# Patient Record
Sex: FEMALE | Race: WHITE | Employment: STUDENT | URBAN - METROPOLITAN AREA
[De-identification: names, ages, dates, MRNs, and addresses within clinical notes are randomized per-mention and may not be internally consistent; named-entity substitution may affect disease eponyms.]

---

## 2018-02-23 ENCOUNTER — RECORDS - HEALTHEAST (OUTPATIENT)
Dept: LAB | Facility: CLINIC | Age: 18
End: 2018-02-23

## 2018-02-23 LAB
ANION GAP SERPL CALCULATED.3IONS-SCNC: 8 MMOL/L (ref 5–18)
BUN SERPL-MCNC: 9 MG/DL (ref 8–22)
C REACTIVE PROTEIN LHE: <0.1 MG/DL (ref 0–0.8)
CALCIUM SERPL-MCNC: 9.9 MG/DL (ref 8.5–10.5)
CHLORIDE BLD-SCNC: 103 MMOL/L (ref 98–107)
CO2 SERPL-SCNC: 31 MMOL/L (ref 22–31)
CREAT SERPL-MCNC: 0.74 MG/DL (ref 0.6–1.1)
GFR SERPL CREATININE-BSD FRML MDRD: >60 ML/MIN/1.73M2
GLUCOSE BLD-MCNC: 84 MG/DL (ref 70–125)
MAGNESIUM SERPL-MCNC: 2.4 MG/DL (ref 1.8–2.6)
POTASSIUM BLD-SCNC: 4.1 MMOL/L (ref 3.5–5)
SODIUM SERPL-SCNC: 142 MMOL/L (ref 136–145)

## 2018-10-05 ENCOUNTER — APPOINTMENT (OUTPATIENT)
Dept: MRI IMAGING | Facility: CLINIC | Age: 18
End: 2018-10-05
Attending: EMERGENCY MEDICINE
Payer: COMMERCIAL

## 2018-10-05 ENCOUNTER — HOSPITAL ENCOUNTER (EMERGENCY)
Facility: CLINIC | Age: 18
Discharge: HOME | End: 2018-10-05
Attending: EMERGENCY MEDICINE | Admitting: EMERGENCY MEDICINE
Payer: COMMERCIAL

## 2018-10-05 VITALS
RESPIRATION RATE: 16 BRPM | WEIGHT: 120.9 LBS | HEART RATE: 82 BPM | DIASTOLIC BLOOD PRESSURE: 76 MMHG | OXYGEN SATURATION: 100 % | TEMPERATURE: 98.3 F | SYSTOLIC BLOOD PRESSURE: 138 MMHG

## 2018-10-05 DIAGNOSIS — H53.9 VISUAL DISTURBANCE: ICD-10-CM

## 2018-10-05 DIAGNOSIS — R42 DIZZINESS AND GIDDINESS: ICD-10-CM

## 2018-10-05 LAB
ALBUMIN SERPL-MCNC: 4.1 G/DL (ref 3.4–5)
ALBUMIN UR-MCNC: NEGATIVE MG/DL
ALP SERPL-CCNC: 37 U/L (ref 40–150)
ALT SERPL W P-5'-P-CCNC: 16 U/L (ref 0–50)
AMORPH CRY #/AREA URNS HPF: ABNORMAL /HPF
ANION GAP SERPL CALCULATED.3IONS-SCNC: 4 MMOL/L (ref 3–14)
APPEARANCE UR: ABNORMAL
AST SERPL W P-5'-P-CCNC: 15 U/L (ref 0–35)
BASOPHILS # BLD AUTO: 0 10E9/L (ref 0–0.2)
BASOPHILS NFR BLD AUTO: 0.2 %
BILIRUB SERPL-MCNC: 0.4 MG/DL (ref 0.2–1.3)
BILIRUB UR QL STRIP: NEGATIVE
BUN SERPL-MCNC: 12 MG/DL (ref 7–30)
CALCIUM SERPL-MCNC: 8.8 MG/DL (ref 8.5–10.1)
CHLORIDE SERPL-SCNC: 105 MMOL/L (ref 96–110)
CK SERPL-CCNC: 63 U/L (ref 30–225)
CO2 SERPL-SCNC: 28 MMOL/L (ref 20–32)
COLOR UR AUTO: YELLOW
CREAT SERPL-MCNC: 0.75 MG/DL (ref 0.5–1)
DIFFERENTIAL METHOD BLD: NORMAL
EOSINOPHIL # BLD AUTO: 0 10E9/L (ref 0–0.7)
EOSINOPHIL NFR BLD AUTO: 0 %
ERYTHROCYTE [DISTWIDTH] IN BLOOD BY AUTOMATED COUNT: 12.5 % (ref 10–15)
GFR SERPL CREATININE-BSD FRML MDRD: >90 ML/MIN/1.7M2
GLUCOSE BLDC GLUCOMTR-MCNC: 126 MG/DL (ref 70–99)
GLUCOSE SERPL-MCNC: 111 MG/DL (ref 70–99)
GLUCOSE UR STRIP-MCNC: NEGATIVE MG/DL
HCG UR QL: NEGATIVE
HCT VFR BLD AUTO: 40.6 % (ref 35–47)
HGB BLD-MCNC: 13.3 G/DL (ref 11.7–15.7)
HGB UR QL STRIP: NEGATIVE
IMM GRANULOCYTES # BLD: 0 10E9/L (ref 0–0.4)
IMM GRANULOCYTES NFR BLD: 0.2 %
INTERNAL QC OK POCT: YES
KETONES UR STRIP-MCNC: NEGATIVE MG/DL
LACTATE BLD-SCNC: 1.2 MMOL/L (ref 0.7–2)
LEUKOCYTE ESTERASE UR QL STRIP: ABNORMAL
LIPASE SERPL-CCNC: 126 U/L (ref 73–393)
LYMPHOCYTES # BLD AUTO: 2.7 10E9/L (ref 0.8–5.3)
LYMPHOCYTES NFR BLD AUTO: 44.1 %
MCH RBC QN AUTO: 30.2 PG (ref 26.5–33)
MCHC RBC AUTO-ENTMCNC: 32.8 G/DL (ref 31.5–36.5)
MCV RBC AUTO: 92 FL (ref 78–100)
MONOCYTES # BLD AUTO: 0.4 10E9/L (ref 0–1.3)
MONOCYTES NFR BLD AUTO: 7.2 %
MUCOUS THREADS #/AREA URNS LPF: PRESENT /LPF
NEUTROPHILS # BLD AUTO: 2.9 10E9/L (ref 1.6–8.3)
NEUTROPHILS NFR BLD AUTO: 48.3 %
NITRATE UR QL: NEGATIVE
NRBC # BLD AUTO: 0 10*3/UL
NRBC BLD AUTO-RTO: 0 /100
PH UR STRIP: 7.5 PH (ref 5–7)
PLATELET # BLD AUTO: 214 10E9/L (ref 150–450)
POTASSIUM SERPL-SCNC: 4 MMOL/L (ref 3.4–5.3)
PROT SERPL-MCNC: 7.6 G/DL (ref 6.8–8.8)
RBC # BLD AUTO: 4.4 10E12/L (ref 3.8–5.2)
RBC #/AREA URNS AUTO: 0 /HPF (ref 0–2)
SODIUM SERPL-SCNC: 137 MMOL/L (ref 133–144)
SOURCE: ABNORMAL
SP GR UR STRIP: 1.02 (ref 1–1.03)
SQUAMOUS #/AREA URNS AUTO: 5 /HPF (ref 0–1)
TRANS CELLS #/AREA URNS HPF: <1 /HPF (ref 0–1)
UROBILINOGEN UR STRIP-MCNC: NORMAL MG/DL (ref 0–2)
WBC # BLD AUTO: 6 10E9/L (ref 4–11)
WBC #/AREA URNS AUTO: ABNORMAL /HPF (ref 0–5)

## 2018-10-05 PROCEDURE — 81003 URINALYSIS AUTO W/O SCOPE: CPT | Performed by: EMERGENCY MEDICINE

## 2018-10-05 PROCEDURE — 82550 ASSAY OF CK (CPK): CPT | Performed by: EMERGENCY MEDICINE

## 2018-10-05 PROCEDURE — 83605 ASSAY OF LACTIC ACID: CPT | Performed by: EMERGENCY MEDICINE

## 2018-10-05 PROCEDURE — 81025 URINE PREGNANCY TEST: CPT | Performed by: EMERGENCY MEDICINE

## 2018-10-05 PROCEDURE — 87086 URINE CULTURE/COLONY COUNT: CPT | Performed by: EMERGENCY MEDICINE

## 2018-10-05 PROCEDURE — 00000146 ZZHCL STATISTIC GLUCOSE BY METER IP

## 2018-10-05 PROCEDURE — 80053 COMPREHEN METABOLIC PANEL: CPT | Performed by: EMERGENCY MEDICINE

## 2018-10-05 PROCEDURE — 99285 EMERGENCY DEPT VISIT HI MDM: CPT | Mod: 25

## 2018-10-05 PROCEDURE — 85025 COMPLETE CBC W/AUTO DIFF WBC: CPT | Performed by: EMERGENCY MEDICINE

## 2018-10-05 PROCEDURE — A9585 GADOBUTROL INJECTION: HCPCS | Performed by: EMERGENCY MEDICINE

## 2018-10-05 PROCEDURE — 25500064 ZZH RX 255 OP 636: Performed by: EMERGENCY MEDICINE

## 2018-10-05 PROCEDURE — 70553 MRI BRAIN STEM W/O & W/DYE: CPT

## 2018-10-05 PROCEDURE — 99285 EMERGENCY DEPT VISIT HI MDM: CPT | Mod: Z6 | Performed by: EMERGENCY MEDICINE

## 2018-10-05 PROCEDURE — 83690 ASSAY OF LIPASE: CPT | Performed by: EMERGENCY MEDICINE

## 2018-10-05 RX ORDER — NORETHINDRONE ACETATE AND ETHINYL ESTRADIOL 1MG-20(21)
1 KIT ORAL DAILY
COMMUNITY

## 2018-10-05 RX ORDER — GADOBUTROL 604.72 MG/ML
7.5 INJECTION INTRAVENOUS ONCE
Status: COMPLETED | OUTPATIENT
Start: 2018-10-05 | End: 2018-10-05

## 2018-10-05 RX ADMIN — GADOBUTROL 5.5 ML: 604.72 INJECTION INTRAVENOUS at 14:43

## 2018-10-05 ASSESSMENT — ENCOUNTER SYMPTOMS
DYSURIA: 0
FEVER: 0
UNEXPECTED WEIGHT CHANGE: 0
FATIGUE: 0
DIZZINESS: 0
PALPITATIONS: 0
BACK PAIN: 0
COUGH: 0
DIARRHEA: 0
EYE REDNESS: 0
CHILLS: 1
LIGHT-HEADEDNESS: 1
APPETITE CHANGE: 0
NAUSEA: 0
EYE PAIN: 0
DECREASED CONCENTRATION: 1
VOMITING: 0
PHOTOPHOBIA: 1
CONSTIPATION: 0
ABDOMINAL PAIN: 0
SHORTNESS OF BREATH: 0
FREQUENCY: 0
SORE THROAT: 0
HEADACHES: 1

## 2018-10-05 ASSESSMENT — VISUAL ACUITY
OD: 20/20
OS: 20/20

## 2018-10-05 NOTE — ED AVS SNAPSHOT
[facility], [place], Emergency Department    [address] [place] [address]    Phone:  [phone]                                       Natalie Kelly   MRN: [id number]    Department:  [facility], Emergency Department   Date of Visit:  10/5/2018           After Visit Summary Signature Page     I have received my discharge instructions, and my questions have been answered. I have discussed any challenges I see with this plan with the nurse or doctor.    ..........................................................................................................................................  Patient/Patient Representative Signature      ..........................................................................................................................................  Patient Representative Print Name and Relationship to Patient    ..................................................               ................................................  Date                                   Time    ..........................................................................................................................................  Reviewed by Signature/Title    ...................................................              ..............................................  Date                                               Time          22EPIC Rev 08/18

## 2018-10-05 NOTE — ED AVS SNAPSHOT
[facility], Emergency Department    [address] [facility] [address]    Phone:  [phone]                                       Natalie Kelly   MRN: [id number]    Department:  [facility], Emergency Department   Date of Visit:  10/5/2018           Patient Information     Date Of Birth          2000        Your diagnoses for this visit were:     Visual disturbance        You were seen by Vera Miller MD.      Follow-up Information     Follow up with Tameka Narayan MD.    Contact information:    [facility] [address] [place] [address] [phone]          Discharge Instructions       We saw you today for evaluation of visual disturbances. Labs and MRI were normal today.    Please make an appointment to follow up with Your Primary Care Provider in 7 days.     Please make an appointment to follow up with Eye Clinic (phone: [phone]).    Please make an appointment to follow up with Neurology Clinic (phone: [phone]).            24 Hour Appointment Hotline       To make an appointment at any [facility], call [phone] ([phone]). If you don't have a family doctor or clinic, we will help you find one. [place] clinics are conveniently located to serve the needs of you and your family.          ED Discharge Orders     NEUROLOGY ADULT REFERRAL       Your provider has referred you for the following:   Consult at [facility]: Neurology Clinic - [place] [phone]   [email or web address]  General Neurology    Please be aware that coverage of these services is subject to the terms and limitations of your health insurance plan.  Call member services at your health plan with any benefit or coverage questions.      Please bring the following with you to your appointment:    (1) Any X-Rays, CTs or MRIs which have been performed.  Contact the facility where they were done to arrange for  prior to your scheduled  appointment.    (2) List of current medications  (3) This referral request   (4) Any documents/labs given to you for this referral                     Review of your medicines      Our records show that you are taking the medicines listed below. If these are incorrect, please call your family doctor or clinic.        Dose / Directions Last dose taken    ADDERALL XR PO   Dose:  20 mg        Take 20 mg by mouth daily   Refills:  0        EFFEXOR PO   Dose:  150 mg        Take 150 mg by mouth daily   Refills:  0        IMITREX PO   Dose:  25 mg        Take 25 mg by mouth as needed for migraine   Refills:  0        norethindrone-ethinyl estradiol 1-20 MG-MCG per tablet   Commonly known as:  JUNEL FE 1/20   Dose:  1 tablet        Take 1 tablet by mouth daily   Refills:  0                Procedures and tests performed during your visit     CBC with platelets differential    CK total    Comprehensive metabolic panel    Glucose by meter    Glucose monitor nursing POCT    Lactic acid whole blood    Lipase    MR Brain w/o & w Contrast    UA reflex to Microscopic and Culture    Vision checks    hCG qual urine POCT      Orders Needing Specimen Collection     None      Pending Results     Date and Time Order Name Status Description    10/5/2018 1342 CK total In process             Pending Culture Results     No orders found from 10/3/2018 to 10/6/2018.            Pending Results Instructions     If you had any lab results that were not finalized at the time of your Discharge, you can call the ED Lab Result RN at [phone]. You will be contacted by this team for any positive Lab results or changes in treatment. The nurses are available 7 days a week from 10A to 6:30P.  You can leave a message 24 hours per day and they will return your call.        Thank you for choosing [facility]       Thank you for choosing [facility] for your care. Our goal is always to provide you with excellent care. Hearing back from our patients is one way  "we can continue to improve our services. Please take a few minutes to complete the written survey that you may receive in the mail after you visit with us. Thank you!        [employer] Information     [employer] lets you send messages to your doctor, view your test results, renew your prescriptions, schedule appointments and more. To sign up, go to [email or web address] . Click on \"Log in\" on the left side of the screen, which will take you to the Welcome page. Then click on \"Sign up Now\" on the right side of the page.     You will be asked to enter the access code listed below, as well as some personal information. Please follow the directions to create your username and password.     Your access code is: [email or web address]  Expires: 1/3/2019  4:50 PM     Your access code will  in 90 days. If you need help or a new code, please call your [place] clinic or [phone].        Care EveryWhere ID     This is your Care EveryWhere ID. This could be used by other organizations to access your [place] medical records  [id number]        Equal Access to Services     MERISSA HENAO : Haddelia Mckinney, du zhang, apolinar cobos, nora gregg. So [facility] [phone].    ATENCIÓN: Si habla español, tiene a dockery disposición servicios gratuitos de asistencia lingüística. Lawrence al [phone].    We comply with applicable federal civil rights laws and [place] laws. We do not discriminate on the basis of race, color, national origin, age, disability, sex, sexual orientation, or gender identity.            After Visit Summary       This is your record. Keep this with you and show to your community pharmacist(s) and doctor(s) at your next visit.                  "

## 2018-10-05 NOTE — DISCHARGE INSTRUCTIONS
We saw you today for evaluation of visual disturbances. Labs and MRI were normal today.    Please make an appointment to follow up with Your Primary Care Provider in 7 days.     Please make an appointment to follow up with Eye Clinic (phone: [phone]).    Please make an appointment to follow up with Neurology Clinic (phone: [phone]).

## 2018-10-05 NOTE — ED PROVIDER NOTES
"  History     Chief Complaint   Patient presents with     Vision Changes Od     HPI  Natalie Kelly is a 17 year old female who presents with second recurrence of vision disturbance, confusion, grogginess, feeling \"off\" and expressive difficulty, headaches. Onset Wednesday, described as hand-eye discoordination, reaching for objects in the wrong locations, freezing of hands when writing and being unable to move. Also notes fatigue and increased sleep. Reports night sweats x last 3 days, soaking through her sheets. On Thursday headache worsened, and vision disturbances described as colour disturbance, depth and focus instability, changes in brightness perception.    Prior episode of prior symptoms occurred this summer when she was in [place] which resolved after resting in an air conditioned room.    PMH: MDD, migraine, ADHD    Has a very extensive history of strange neurological manifestations including twitching; cyclic migraines starting at age 5; extremity tingling, paresthesias; and starting at age 8 episodes of hands freezing, staring - never diagnosed with absence seizures. Unsure if she loses consciousness/memory during these episodes. Has completed extensive journalling of her symptoms in the past but has not found any correlations.    FH: great grandfather (her mother's maternal grandfather) had unusual neurologic disease and  suddenly in his 50s. Extensive family history of autoimmune diseases.  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    I have reviewed the Medications, Allergies, Past Medical and Surgical History, and Social History in the Epic system.    Review of Systems   Constitutional: Positive for chills. Negative for appetite change, fatigue, fever and unexpected weight change.   HENT: Negative for sore throat.    Eyes: Positive for photophobia and visual disturbance. Negative for pain and redness. "   Respiratory: Negative for cough and shortness of breath.    Cardiovascular: Negative for chest pain, palpitations and leg swelling.   Gastrointestinal: Negative for abdominal pain, constipation, diarrhea, nausea and vomiting.   Genitourinary: Negative for dysuria and frequency.   Musculoskeletal: Negative for back pain.   Neurological: Positive for light-headedness and headaches. Negative for dizziness.   Psychiatric/Behavioral: Positive for decreased concentration.   All other systems reviewed and are negative.      Physical Exam   BP: 130/80  Heart Rate: 103  Temp: 98.3  F (36.8  C)  Resp: 16  Weight: 54.8 kg (120 lb 14.4 oz)  SpO2: 98 %  Visual Acuity-Left: 20/20  Visual Acuity-Right: 20/20      Physical Exam   Constitutional: She is oriented to person, place, and time. She appears well-developed and well-nourished. No distress.   HENT:   Head: Normocephalic and atraumatic.   Eyes: Conjunctivae and EOM are normal. Pupils are equal, round, and reactive to light.   Visual acuity 20/20   Neck: Normal range of motion.   Cardiovascular: Normal rate, regular rhythm and normal heart sounds.    Pulmonary/Chest: Effort normal and breath sounds normal. No respiratory distress. She has no wheezes. She has no rales. She exhibits no tenderness.   Abdominal: Soft. There is no tenderness.   Musculoskeletal: Normal range of motion.   Neurological: She is alert and oriented to person, place, and time. She has normal strength. No cranial nerve deficit or sensory deficit. She displays a negative Romberg sign. GCS eye subscore is 4. GCS verbal subscore is 5. GCS motor subscore is 6.   Reflex Scores:       Patellar reflexes are 3+ on the right side and 3+ on the left side.  Skin: Skin is warm. She is not diaphoretic.   Psychiatric: She has a normal mood and affect.       ED Course     ED Course     Procedures           Results for orders placed or performed during the hospital encounter of 10/05/18 (from the past 24 hour(s))    Glucose by meter   Result Value Ref Range    Glucose 126 (H) 70 - 99 mg/dL   CBC with platelets differential   Result Value Ref Range    WBC 6.0 4.0 - 11.0 10e9/L    RBC Count 4.40 3.8 - 5.2 10e12/L    Hemoglobin 13.3 11.7 - 15.7 g/dL    Hematocrit 40.6 35.0 - 47.0 %    MCV 92 78 - 100 fl    MCH 30.2 26.5 - 33.0 pg    MCHC 32.8 31.5 - 36.5 g/dL    RDW 12.5 10.0 - 15.0 %    Platelet Count 214 150 - 450 10e9/L    Diff Method Automated Method     % Neutrophils 48.3 %    % Lymphocytes 44.1 %    % Monocytes 7.2 %    % Eosinophils 0.0 %    % Basophils 0.2 %    % Immature Granulocytes 0.2 %    Nucleated RBCs 0 0 /100    Absolute Neutrophil 2.9 1.6 - 8.3 10e9/L    Absolute Lymphocytes 2.7 0.8 - 5.3 10e9/L    Absolute Monocytes 0.4 0.0 - 1.3 10e9/L    Absolute Eosinophils 0.0 0.0 - 0.7 10e9/L    Absolute Basophils 0.0 0.0 - 0.2 10e9/L    Abs Immature Granulocytes 0.0 0 - 0.4 10e9/L    Absolute Nucleated RBC 0.0    Comprehensive metabolic panel   Result Value Ref Range    Sodium 137 133 - 144 mmol/L    Potassium 4.0 3.4 - 5.3 mmol/L    Chloride 105 96 - 110 mmol/L    Carbon Dioxide 28 20 - 32 mmol/L    Anion Gap 4 3 - 14 mmol/L    Glucose 111 (H) 70 - 99 mg/dL    Urea Nitrogen 12 7 - 30 mg/dL    Creatinine 0.75 0.50 - 1.00 mg/dL    GFR Estimate >90 >60 mL/min/1.7m2    GFR Estimate If Black >90 >60 mL/min/1.7m2    Calcium 8.8 8.5 - 10.1 mg/dL    Bilirubin Total 0.4 0.2 - 1.3 mg/dL    Albumin 4.1 3.4 - 5.0 g/dL    Protein Total 7.6 6.8 - 8.8 g/dL    Alkaline Phosphatase 37 (L) 40 - 150 U/L    ALT 16 0 - 50 U/L    AST 15 0 - 35 U/L   Lipase   Result Value Ref Range    Lipase 126 73 - 393 U/L   Lactic acid whole blood   Result Value Ref Range    Lactic Acid 1.2 0.7 - 2.0 mmol/L   UA reflex to Microscopic and Culture   Result Value Ref Range    Color Urine Yellow     Appearance Urine Cloudy     Glucose Urine Negative NEG^Negative mg/dL    Bilirubin Urine Negative NEG^Negative    Ketones Urine Negative NEG^Negative mg/dL     Specific Gravity Urine 1.018 1.003 - 1.035    Blood Urine Negative NEG^Negative    pH Urine 7.5 (H) 5.0 - 7.0 pH    Protein Albumin Urine Negative NEG^Negative mg/dL    Urobilinogen mg/dL Normal 0.0 - 2.0 mg/dL    Nitrite Urine Negative NEG^Negative    Leukocyte Esterase Urine Large (A) NEG^Negative    Source Clean catch urine     RBC Urine 0 0 - 2 /HPF    WBC Urine None 0 - 5 /HPF    Squamous Epithelial /HPF Urine 5 (H) 0 - 1 /HPF    Transitional Epi <1 0 - 1 /HPF    Mucous Urine Present (A) NEG^Negative /LPF    Amorphous Crystals Few (A) NEG^Negative /HPF   hCG qual urine POCT   Result Value Ref Range    HCG Qual Urine Negative neg    Internal QC OK Yes    MR Brain w/o & w Contrast    Narrative    MR BRAIN W/O & W CONTRAST 10/5/2018 2:42 PM    Provided History: visual changes, confusion, h/o migraine headaches;     Comparison:  None     Technique: Sagittal T1-weighted and axial T2-weighted, turboFLAIR and  diffusion-weighted with ADC map images of the brain were obtained  without intravenous contrast.    Findings: These images reveal no intracranial mass lesion, mass  effect, midline shift or abnormal extraaxial fluid collection. The  ventricles and sulci are normal for age. Diffusion-weighted images  demonstrate no abnormality of reduced diffusion.  Normal intravascular  flow voids are identified.      Impression    Impression: No findings on this study to explain the patient's  clinical presentation..    I have personally reviewed the examination and initial interpretation  and I agree with the findings.    JACY SEGUNDO MD      Labs Ordered and Resulted from Time of ED Arrival Up to the Time of Departure from the ED   GLUCOSE BY METER - Abnormal; Notable for the following:        Result Value    Glucose 126 (*)     All other components within normal limits   COMPREHENSIVE METABOLIC PANEL - Abnormal; Notable for the following:     Glucose 111 (*)     Alkaline Phosphatase 37 (*)     All other components within  normal limits   UA MACROSCOPIC WITH REFLEX TO MICRO AND CULTURE - Abnormal; Notable for the following:     pH Urine 7.5 (*)     Leukocyte Esterase Urine Large (*)     Squamous Epithelial /HPF Urine 5 (*)     Mucous Urine Present (*)     Amorphous Crystals Few (*)     All other components within normal limits   HCG QUAL URINE POCT - Normal   GLUCOSE MONITOR NURSING POCT   CBC WITH PLATELETS DIFFERENTIAL   LIPASE   LACTIC ACID WHOLE BLOOD   VISION CHECKS            Assessments & Plan (with Medical Decision Making)   18 yo F with PMH migraines, MDD, ADHD, and extensive history of undiagnosed neurologic disturbances presenting for second episode of acute vision changes, confusion, dizziness. Differential include absence seizure, intracranial neoplasm, atypical migraine, vertigo. Neurological exam grossly normal except for increased patellar reflexes. Visual acuity 20/20.    CBC, CMP, UA, LA, lipase wnl. MRI unremarkable. Her neurological issues are ongoing and patient and mother understands that we are unlikely to arrive at a diagnosis today, and accepts reassurance for current symptoms based on normal labs and MRI.      Neurology consulted, recommended neurology outpatient follow up as well as opthalmology follow up for dilated exam. Patient stable for discharge to home.    I have reviewed the nursing notes.    I have reviewed the findings, diagnosis, plan and need for follow up with the patient.    New Prescriptions    No medications on file       Final diagnoses:   Visual disturbance       10/5/2018   [facility], [place], EMERGENCY DEPARTMENT  ATTENDING NOTE: The patient was seen with Dr. Shoemaker, resident. I was personally and directly involved in patient care including obtaining the history, performing the physical exam, ordering and reviewing of laboratory tests, and decision-making process. Plan of care was discussed with the patient. Natalie Kelly is a 17 year old female who presents with visual disturbances and  confusion.   Physical Exam   /80  Temp 98.3  F (36.8  C) (Oral)  Resp 16  Wt 54.8 kg (120 lb 14.4 oz)  SpO2 98%   Constitutional:   well nourished, well developed, resting comfortably   HENT:   Head: Normocephalic and atraumatic.   Eyes: Conjunctivae are normal. Pupils are equal, round, and reactive to light. EOMI visual acuity is 20/20 on the left and 20/20 on the right  pharynx has no erythema or exudate, mucous membranes are moist  Neck:   no adenopathy, no bony tenderness  Cardiovascular: regular rate and rhythm without murmurs or gallops  Pulmonary/Chest: Clear to auscultation bilaterally, with no wheezes or retractions. No respiratory distress.  GI: Soft with good bowel sounds.  Non-tender, non-distended, with no guarding, no rebound, no peritoneal signs.   Back:  No bony or CVA tenderness   Musculoskeletal:  no edema or clubbing   Skin: Skin is warm and dry. No rash noted.   Neurological: alert and oriented to person, place, and time. Nonfocal exam  NEUROLOGIC: speech normal, mental status intact, muscle tone normal, muscle strength normal, 2+ patellar reflexes 2/4 and symmetric, CN II-XII intact: face symmetric, facial sensation to light touch equal, symmetric cheek puff, eyebrow raise, palate elevation. PERRL, no nystagmus. EOMI. Tongue midline. Shoulder shrug strong and symmetric.  Motor: normal bulk/tone, no muscle wasting or fasciculations  No pronator drift, Romberg negative, able to do finger to nose accurately,   Gait: normal, able to tiptoe and heel walk, able to walk heel to toe     Psychiatric:  normal mood and affect.   Emergency Department course:  The patient was seen and examined at 1255 pm.   Laboratory studies show an unremarkable CBC, CMP and lipase apart from an elevated glucose of 111. Lactate is normal as 1.2.  UA is contaminated with squamous epithelial cells .  HCG is negative.   Given the patient's symptoms, I elected to obtain an MRI of the brain with and without contrast.   MRI is unremarkable and do not explain this patient's clinical presentation.  Visual acuity is 20/20 in both eyes.   The patient was also seen by neurology in consultation.  Please see their note for details.  This is a 17-year-old female with visual complaints and confusion as noted in HPI above.  Laboratory and radiographic studies are essentially unremarkable.  I believe the patient is safe to be discharged home.  She was given the number to the eye clinic for follow up.  In addition she should follow-up with her regular physician as well as with neurology as an outpatient.   Vera Miller MD  This note was created in part by the use of Dragon voice recognition dictation system. Inadvertent grammatical errors and typographical errors may still exist.  MD Paul Garsia Alda L, MD  10/05/18 0332

## 2018-10-05 NOTE — ED TRIAGE NOTES
Pt comes in with c/o vision changes, confusion, mild headache, and fatigue since Wednesday. Pt says this happened once before while she was travelling in [place] over the summer. At that time her PCP thought her symptoms were related to heat exhaustion. Alert and oriented. VSS.

## 2018-10-05 NOTE — ED AVS SNAPSHOT
[facility], [place], EMERGENCY DEPARTMENT: [phone]                                              INTERAGENCY TRANSFER FORM - LAB / IMAGING / EKG / EMG RESULTS   10/5/2018                    Hospital Admission Date: 10/5/2018  SALONI HERMOSILLO   : 2000  Sex: Female        Attending Provider: Vera Miller MD     Allergies:  No Known Allergies    Infection:  None   Service:  None    Ht:  --   Wt:  54.8 kg (120 lb 14.4 oz)   Admission Wt:  54.8 kg (120 lb 14.4 oz)    BMI:  --   BSA:  --            Patient PCP Information     Provider PCP Type    Tameka Narayan MD General         Lab Results - 3 Days      CK total [[id number]]  Resulted: 10/05/18 1626, Result status: In process    Ordering provider: Vera Miller MD  10/05/18 1342 Resulting lab: [facility]    Specimen Information    Type Source Collected On     10/05/18 1342            UA reflex to Microscopic and Culture [[id number]] (Abnormal)  Resulted: 10/05/18 1441, Result status: Edited Result - FINAL    Ordering provider: Vera Miller MD  10/05/18 1318 Resulting lab: [facility]    Specimen Information    Type Source Collected On   Urine Urine clean catch 10/05/18 1402          Components       Value Reference Range Flag Lab   Color Urine Yellow   51   Appearance Urine Cloudy   51   Glucose Urine Negative NEG^Negative mg/dL  51   Bilirubin Urine Negative NEG^Negative  51   Ketones Urine Negative NEG^Negative mg/dL  51   Specific Gravity Urine 1.018 1.003 - 1.035  51   Blood Urine Negative NEG^Negative  51   pH Urine 7.5 5.0 - 7.0 pH H 51   Protein Albumin Urine Negative NEG^Negative mg/dL  51   Urobilinogen mg/dL Normal 0.0 - 2.0 mg/dL  51   Nitrite Urine Negative NEG^Negative  51   Leukocyte Esterase Urine Large NEG^Negative A 51   Source Clean catch urine   51   RBC Urine 0 0 - 2 /HPF  51   WBC Urine None 0 - 5 /HPF  51   Squamous Epithelial /HPF Urine 5 0 - 1 /HPF H 51   Transitional Epi <1 0 - 1 /HPF  51   Mucous  Urine Present NEG^Negative /LPF A 51   Amorphous Crystals Few NEG^Negative /HPF A 51            Lipase [[id number]]  Resulted: 10/05/18 1415, Result status: Final result    Ordering provider: Vera Miller MD  10/05/18 1318 Resulting lab: [facility]    Specimen Information    Type Source Collected On   Blood  10/05/18 1342          Components       Value Reference Range Flag Lab   Lipase 126 73 - 393 U/L  51            Comprehensive metabolic panel [[id number]] (Abnormal)  Resulted: 10/05/18 1415, Result status: Final result    Ordering provider: Vera Miller MD  10/05/18 1318 Resulting lab: [facility]    Specimen Information    Type Source Collected On   Blood  10/05/18 1342          Components       Value Reference Range Flag Lab   Sodium 137 133 - 144 mmol/L  51   Potassium 4.0 3.4 - 5.3 mmol/L  51   Chloride 105 96 - 110 mmol/L  51   Carbon Dioxide 28 20 - 32 mmol/L  51   Anion Gap 4 3 - 14 mmol/L  51   Glucose 111 70 - 99 mg/dL H 51   Urea Nitrogen 12 7 - 30 mg/dL  51   Creatinine 0.75 0.50 - 1.00 mg/dL  51   GFR Estimate >90 >60 mL/min/1.7m2  51   Comment:  Non  GFR Calc   GFR Estimate If Black >90 >60 mL/min/1.7m2  51   Comment:  African American GFR Calc   Calcium 8.8 8.5 - 10.1 mg/dL  51   Bilirubin Total 0.4 0.2 - 1.3 mg/dL  51   Albumin 4.1 3.4 - 5.0 g/dL  51   Protein Total 7.6 6.8 - 8.8 g/dL  51   Alkaline Phosphatase 37 40 - 150 U/L L 51   ALT 16 0 - 50 U/L  51   AST 15 0 - 35 U/L  51            Lactic acid whole blood [[id number]]  Resulted: 10/05/18 1356, Result status: Final result    Ordering provider: Vera Miller MD  10/05/18 1312 Resulting lab: [facility]    Specimen Information    Type Source Collected On   Blood  10/05/18 1342          Components       Value Reference Range Flag Lab   Lactic Acid 1.2 0.7 - 2.0 mmol/L  51            CBC with platelets differential  [[id number]]  Resulted: 10/05/18 1354, Result status: Final result    Ordering provider: Vera Miller MD  10/05/18 1318 Resulting lab: [facility]    Specimen Information    Type Source Collected On   Blood  10/05/18 1342          Components       Value Reference Range Flag Lab   WBC 6.0 4.0 - 11.0 10e9/L  51   RBC Count 4.40 3.8 - 5.2 10e12/L  51   Hemoglobin 13.3 11.7 - 15.7 g/dL  51   Hematocrit 40.6 35.0 - 47.0 %  51   MCV 92 78 - 100 fl  51   MCH 30.2 26.5 - 33.0 pg  51   MCHC 32.8 31.5 - 36.5 g/dL  51   RDW 12.5 10.0 - 15.0 %  51   Platelet Count 214 150 - 450 10e9/L  51   Diff Method Automated Method   51   % Neutrophils 48.3 %  51   % Lymphocytes 44.1 %  51   % Monocytes 7.2 %  51   % Eosinophils 0.0 %  51   % Basophils 0.2 %  51   % Immature Granulocytes 0.2 %  51   Nucleated RBCs 0 0 /100  51   Absolute Neutrophil 2.9 1.6 - 8.3 10e9/L  51   Absolute Lymphocytes 2.7 0.8 - 5.3 10e9/L  51   Absolute Monocytes 0.4 0.0 - 1.3 10e9/L  51   Absolute Eosinophils 0.0 0.0 - 0.7 10e9/L  51   Absolute Basophils 0.0 0.0 - 0.2 10e9/L  51   Abs Immature Granulocytes 0.0 0 - 0.4 10e9/L  51   Absolute Nucleated RBC 0.0   51            Glucose by meter [[id number]] (Abnormal)  Resulted: 10/05/18 1145, Result status: Final result    Ordering provider: Unknown, Provider  10/05/18 1133 Resulting lab: POINT OF CARE TEST, GLUCOSE    Specimen Information    Type Source Collected On     10/05/18 1133          Components       Value Reference Range Flag Lab   Glucose 126 70 - 99 mg/dL H 170            Testing Performed By     Lab - Abbreviation Name Director Address Valid Date Range    45 - [facility] Unknown Unknown 01/28/02 0000 - Present    51 - Unknown [facility] Unknown [address] [facility] [address] 12/31/14 1010 - Present    170 - Unknown POINT OF CARE TEST, GLUCOSE Unknown Unknown 10/31/11 1114 - Present            Unresulted Labs     None          Imaging Results - 3 Days      MR Brain w/o & w Contrast [[id number]]  Resulted: 10/05/18 1521, Result status: Final result    Ordering provider: Vera Miller MD  10/05/18 1319 Resulted by: Eric Segundo MD Levy, Gilbert Callaway MD    Performed: 10/05/18 1415 - 10/05/18 1442 Resulting lab: RADIOLOGY RESULTS    Narrative:       MR BRAIN W/O & W CONTRAST 10/5/2018 2:42 PM    Provided History: visual changes, confusion, h/o migraine headaches;     Comparison:  None     Technique: Sagittal T1-weighted and axial T2-weighted, turboFLAIR and  diffusion-weighted with ADC map images of the brain were obtained  without intravenous contrast.    Findings: These images reveal no intracranial mass lesion, mass  effect, midline shift or abnormal extraaxial fluid collection. The  ventricles and sulci are normal for age. Diffusion-weighted images  demonstrate no abnormality of reduced diffusion.  Normal intravascular  flow voids are identified.      Impression:       Impression: No findings on this study to explain the patient's  clinical presentation..    I have personally reviewed the examination and initial interpretation  and I agree with the findings.    ERIC SEGUNDO MD      Testing Performed By     Lab - Abbreviation Name Director Address Valid Date Range    104 - Rad [facility] RADIOLOGY RESULTS Unknown Unknown 02/16/05 1553 - Present            Encounter-Level Documents:     There are no encounter-level documents.      Order-Level Documents:     There are no order-level documents.

## 2018-10-05 NOTE — CONSULTS
Neurology Consultation    Natalie Kelly MRN# [id number]   YOB: 2000 Age: 17 year old   Date of Admission: 10/5/2018     Reason for consult: Vision changes, confusion, headache           Assessment and Plan:   17 year old female with PMH significant for migraines who presented with a 3 day history of depth perception and eye focusing abnormalities and confusion with negative MRI of the brain.       # Visual disturbance  # Confusion  It is unclear what the etiology of these symptoms is. It is reassuring that they resolved on their own when they occurred over the summer and that her MRI is negative. These symptoms will likely self-resolve as well. Thus, I don't think this presentation warrants admission or further more invasive work-up, such as an LP, and the patient and her mother agree. I do think that she would benefit from having an outpatient neurologist follow her.  - CK level  - Outpatient Neurology follow-up  - EEG prior to neurology appointment  - Ophthalmology appointment for thorough vision exam          Chief Complaint:   Visual changes       History is obtained from the patient and the patient's mother         History of Present Illness:   This patient is a 17 year old female with PMH significant for depression, PTSD, and migraines, who presents with 2 day history of vision changes, confusion, headache, and fatigue. Wednesday morning she slept later than usual and woke-up feeling groggy. She describes it as a disconnect between her brain and her body. She said she would be holding a shirt and know that she needs to get dressed, but not know how to do it or why she had to get dressed. She also felt an internal off-balance feeling, like she struggled to walk straight and stay upright. However, she reports that she was not swaying or stumbling when she walked. She denies vertigo and lightheadedness. Natalie also reports visual changes. She says her depth perception is off. She would bend  down to pick something up and not be able to grab it, like she was reaching for the wrong spot. She says she has a hard time focusing her vision as well. Of note, she had a similar episode in [place] over the summer that they believe was related to heat exhaustion and improved with time in the air conditioning. Natalie describes these events where she is writing and all of a sudden her hand goes limp and drops the pencil. She then stares at her hand and tries to move it, but is unable. During these episodes everything going on around her gets tuned out or goes out of focus and all she can focus on is getting her hand to move. These episodes only happen when she is sick and have been happening the last 3 days. Her mom also reports episodes where Natalie will stare off into space for a minute or so and not respond to her name. Finally, Natalie describes another type of episode that occurs about once/week where she is walking along and suddenly gets a sharp, severe pain in a line about 2 inches long that causes her to freeze and takes her breath away. She has to stop, take a deep breath and wait for the pain to subside. She then will have residual pain throughout her body for some time afterwards. These episodes last 20-30 seconds. This pain usually occurs in her right forearm, although has also occurred in her thighs, head, and left forearm. Occasionally, she will notice a red fer with white surrounding where the pain is.      ROS also positive for 3 days of night sweats. No fever, weight loss.                    Past Medical History:     Past Medical History:   Diagnosis Date     Anxiety      Depressive disorder      Migraine      Uncomplicated asthma                 Past Surgical History:     Past Surgical History:   Procedure Laterality Date     ENT SURGERY-TNA  [place] teeth                Social History:     Social History     Social History     Marital status: Single     Spouse name: N/A     Number of children:  N/A     Years of education: N/A     Occupational History     Not on file.     Social History Main Topics     Smoking status: Not a smoker     Smokeless tobacco: None     Alcohol use None     Drug use: None     Sexual activity: Not sexually active            Family History:   Family history of autoimmune disorders, undiagnosable  Maternal great grandpa-unknown neuro problem, seizures  Maternal grandma-factor V mutation         Immunizations:   Up to date          Allergies:   Garlic-headache, GI, respiratory          Medications:   effexor 150mg daily  Adderall  ER 20mg daily  Adderall 10mg PRN  5000 vit D once weekly  Sumatriptin  Ondansetron  Albuterol inhaler prn  Multivitamin  B12  Apple cider vinegar tablets for reflux  Birth control-Junel         Review of Systems:   The Review of Systems is negative other than noted in the HPI         Neurology Focused Physical Exam:   The following assessments were done and were normal unless otherwise specified:  General Comments:   none   Handedness:   Right handed   Mental Status:   Level of consciousness - normal  Orientation - normal  Language - normal  Memory - normal  Attention / concentration - normal  Fund of knowledge - normal   Cranial Nerves:   Cranial nerves II through XII are grossly intact  Loses focus when tracking back to midline  Nystagmus when tracking back to midline  Fundoscopic exam: normal   Dysphagia Screening tool:   Dysphagia screening tool not indicated at this time   Motor:   Muscle bulk - normal  Muscle tone - normal  Muscle strength - normal  Arm drift - none  Speed and dexterity - normal   Sensory perception:   Pain and temperature - normal  Position - normal  Vibration - normal  Light touch - normal  Rhomberg test - normal   Reflexes:   Deep tendon reflexes- normal  Deep tendon reflexes: brisk in upper and lower extremities, symmetric  Babinski - normal, downgoing   Cerebellar Coordination:   Finger to nose- normal  Heel to shin - normal  Trunk  stability - normal   Gait / Stance:   Normal gait, heel walking, toe walking, heel-toe walking             Data:   All laboratory data reviewed    MRI Brain w/ and w/o contrast:  Impression: No findings on this study to explain the patient's  clinical presentation..      Attending physician: Dr. Miller of ED requested neurologic consultation for vision changes, fatigue and nerve pain for opinion regarding etiology.    I saw and evaluated the patient on 10/5/2018 with the resident team and I agree with the findings and plan of care as per. Dr. Crandall above, with the following additions.     The patient is a 17 year old female presenting with a constellation of abnormal neurologic symptoms including headache, macropsia, scotomas in peripheral vision, short duration nerve pain that does not localize to single root or nerve, and possibly mild fatigue/confusion.  MRI brain negative for any pathology to explain symptoms.  Neurologic exam is normal and I do not see any papilledema on fundoscopy.   Family is planning on seeing neurology later this month as outpatient.  I suspect some of this may be related migraine although it would be difficult to explain whole presentation.  I did discuss that I think pre-test probability is low for these symptoms but if they wanted full neurologic work up other studies including admission, EEG and LP could be obtained to rule out any more serious causes.    They are not interested at this time and would prefer to follow up outpatient which I think is very reasonable.  Will also recommend ophtho follow up for visual symptoms to insure not ocular cause such as retinal disease although also feel this is unlikely in this case.          Carl Nix DO   of Neurology

## 2018-10-06 LAB
BACTERIA SPEC CULT: NORMAL
Lab: NORMAL
SPECIMEN SOURCE: NORMAL

## 2018-10-07 ENCOUNTER — HEALTH MAINTENANCE LETTER (OUTPATIENT)
Age: 18
End: 2018-10-07

## 2018-10-18 ENCOUNTER — OFFICE VISIT (OUTPATIENT)
Dept: NEUROLOGY | Facility: CLINIC | Age: 18
End: 2018-10-18
Payer: COMMERCIAL

## 2018-10-18 VITALS
WEIGHT: 116.8 LBS | BODY MASS INDEX: 22.05 KG/M2 | RESPIRATION RATE: 15 BRPM | SYSTOLIC BLOOD PRESSURE: 118 MMHG | HEART RATE: 105 BPM | OXYGEN SATURATION: 99 % | HEIGHT: 61 IN | TEMPERATURE: 98.7 F | DIASTOLIC BLOOD PRESSURE: 86 MMHG

## 2018-10-18 DIAGNOSIS — G43.109 MIGRAINE WITH AURA AND WITHOUT STATUS MIGRAINOSUS, NOT INTRACTABLE: Primary | ICD-10-CM

## 2018-10-18 RX ORDER — ERGOCALCIFEROL 1.25 MG/1
CAPSULE, LIQUID FILLED ORAL
COMMUNITY
Start: 2018-05-08

## 2018-10-18 RX ORDER — SUMATRIPTAN 5 MG/1
SPRAY NASAL
Qty: 30 EACH | Refills: 11 | Status: SHIPPED | OUTPATIENT
Start: 2018-10-18

## 2018-10-18 ASSESSMENT — ENCOUNTER SYMPTOMS
DECREASED APPETITE: 1
FATIGUE: 1
LIGHT-HEADEDNESS: 0
WEIGHT LOSS: 1
FEVER: 0
SPEECH CHANGE: 1
EYE WATERING: 0
PARALYSIS: 0
SYNCOPE: 0
SEIZURES: 0
HALLUCINATIONS: 0
PALPITATIONS: 1
INCREASED ENERGY: 1
TINGLING: 0
MEMORY LOSS: 1
CHILLS: 1
DOUBLE VISION: 1
EYE PAIN: 0
SLEEP DISTURBANCES DUE TO BREATHING: 0
DISTURBANCES IN COORDINATION: 1
WEIGHT GAIN: 0
HYPERTENSION: 0
ORTHOPNEA: 0
DECREASED LIBIDO: 0
TREMORS: 0
EYE REDNESS: 0
DIZZINESS: 1
POLYDIPSIA: 1
EXERCISE INTOLERANCE: 0
POLYPHAGIA: 0
WEAKNESS: 1
LOSS OF CONSCIOUSNESS: 0
NIGHT SWEATS: 1
EYE IRRITATION: 0
ALTERED TEMPERATURE REGULATION: 1
HYPOTENSION: 0
NUMBNESS: 0
LEG PAIN: 0
HOT FLASHES: 0
HEADACHES: 1

## 2018-10-18 ASSESSMENT — PAIN SCALES - GENERAL: PAINLEVEL: MILD PAIN (2)

## 2018-10-18 NOTE — LETTER
10/18/2018       RE: Natalie Kelly  [address]  [facility] [id number]     Dear Colleague,    Thank you for referring your patient, Natalie Kelly, to the [facility] NEUROLOGY at [facility] acute [facility]. Please see a copy of my visit note below.    Answers for HPI/ROS submitted by the patient on 10/18/2018   General Symptoms: Yes  Skin Symptoms: No  HENT Symptoms: No  EYE SYMPTOMS: Yes  HEART SYMPTOMS: Yes  LUNG SYMPTOMS: No  INTESTINAL SYMPTOMS: No  URINARY SYMPTOMS: No  GYNECOLOGIC SYMPTOMS: Yes  BREAST SYMPTOMS: No  SKELETAL SYMPTOMS: No  BLOOD SYMPTOMS: No  NERVOUS SYSTEM SYMPTOMS: Yes  MENTAL HEALTH SYMPTOMS: No  PEDS Symptoms: No  Fever: No  Loss of appetite: Yes  Weight loss: Yes  Weight gain: No  Fatigue: Yes  Night sweats: Yes  Chills: Yes  Increased stress: Yes  Excessive hunger: No  Excessive thirst: Yes  Feeling hot or cold when others believe the temperature is normal: Yes  Loss of height: No  Post-operative complications: No  Surgical site pain: No  Hallucinations: No  Change in or Loss of Energy: Yes  Hyperactivity: No  Confusion: Yes  Eye pain: No  Vision loss: Yes  Dry eyes: No  Watery eyes: No  Eye bulging: No  Double vision: Yes  Flashing of lights: Yes  Spots: Yes  Floaters: Yes  Redness: No  Crossed eyes: No  Tunnel Vision: No  Yellowing of eyes: No  Eye irritation: No  Chest pain or pressure: No  Fast or irregular heartbeat: Yes  Pain in legs with walking: No  Trouble breathing while lying down: No  Fingers or toes appear blue: No  High blood pressure: No  Low blood pressure: No  Fainting: No  Murmurs: No  Pacemaker: No  Varicose veins: No  Edema or swelling: No  Wake up at night with shortness of breath: No  Light-headedness: No  Exercise intolerance: No  Trouble with coordination: Yes  Dizziness or trouble with balance: Yes  Fainting or black-out spells: No  Memory loss: Yes  Headache: Yes  Seizures: No  Speech problems: Yes  Tingling: No  Tremor:  No  Weakness: Yes  Difficulty walking: No  Paralysis: No  Numbness: No  Bleeding or spotting between periods: Yes  Heavy or painful periods: No  Irregular periods: Yes  Vaginal discharge: No  Hot flashes: No  Vaginal dryness: No  Genital ulcers: No  Reduced libido: No  Painful intercourse: No  Difficulty with sexual arousal: No  Post-menopausal bleeding: No      Service Date: 10/18/2018      Tameka Narayan MD   [facility] [place] [address]      RE: Natalie Kelly   MRN: [id number]   : 2000      Dear Dr. Narayan:      Ms. Natalie Kelly is a 17-year-old who is a student of [employer] here at [employer] and aiming to study dyslexia.  She had an episode where she was seen in the emergency room on 10/05, where she had this sensation throughout the day of confusion, grogginess and feeling off, and this started as difficulty with hand and eye coordination, reaching for objects, freezing of the hands when writing, fatigue and increased sleep and some night sweats with some headaches eventually developing and color disturbance, depth and focus instability and brightness.  She came to the emergency room where she was evaluated with these episodes and had a normal exam including visual acuity, O2 sats, blood pressure.  Her neurological exam and physical exam were both normal.  Lab studies were pretty normal and she had an MRI of the brain which has been reviewed by the undersigned.  It is completely normal as well and was with and without contrast.  Pregnancy test is negative and the urine test was likewise negative.  This was her second episode, according to the ER note and status-wise, she was referred for Neurology.  She is visiting today accompanied by her mother, who is a very good historian as well as the patient.      She has had no further problems since that time.  She says she did not drink  that day and did not take any drugs or anything.  She is on Lexapro and  Effexor but her dose has been stable, so has her birth control pills been stable.  She has felt fine since and came in to be checked.  She does have a history of headaches which have been occurring 4-5 times a month with some visual auras associated with it, and they started when she was 5 years old.  The mother said she has migraine.  They favor that they relate to the weather and pressure changes.  She has been on sumatriptan on low dose of 25 mg that she takes once and does not repeat it.  She is also on Effexor 150 per day, birth control pill once per day, which she was on after the headache onset and she is also on amphetamine-dextroamphetamine, Adderall, for ADHD which the mother said runs in the family.      PAST MEDICAL HISTORY:  Essentially otherwise unremarkable.  There is some history of depression, anxiety and uncomplicated asthma.      FAMILY HISTORY:  Shows there is significant migraine in the mother and an uncle.      REVIEW OF SYSTEMS:  Does not show anything else of note except for excessive thirst, feeling hot or cold, but the day of this episode, she was not feeling particularly off to suspect a serotonin syndrome.      PHYSICAL EXAMINATION:  Today, she is very pleasant.  Blood pressure 118/86, pulse 105, O2 sats are normal.   MENTAL STATUS:  Exam is excellent.  She is a very bright young woman.  Cranial nerves show some beautiful eye grounds with very well seen optic disks.  She has normal extraocular movements.   NECK:  Supple.   REFLEXES:  Normoactive, symmetrical.  ___ left-handed, but gravitated to right-handed or ambidexterity.   EXTREMITIES:  Gait and station are unremarkable.  Sensory and motor exams are normal.      In summary, in this episode she had peculiar feelings.  I am not sure what it represented.  She has had other episodes where her arms just stop working briefly, but these are associated sometimes with headaches but it may switch sides.  There is no family history of hemiplegic  migraine.  She has used sumatriptan, but on a low dose, and will try her on the nasal spray, 1 in each nostril at onset and may repeat in 1 hour.  She has had an MRI, which was reviewed and is completely normal.  The episodes of peculiar so will obtain an EEG to rule out a seizure.  We will see her back after this study is completed.  Thank you for referring to Neurology.      Sincerely,      Ken Durbin MD

## 2018-10-18 NOTE — MR AVS SNAPSHOT
After Visit Summary   10/18/2018    Natalie Kelly    MRN: [id number]           Patient Information     Date Of Birth          2000        Visit Information        Provider Department      10/18/2018 3:30 PM Ken Durbin MD [facility] Neurology        Today's Diagnoses     Migraine with aura and without status migrainosus, not intractable    -  1       Follow-ups after your visit        Follow-up notes from your care team     Return in about 3 months (around 2019).      Your next 10 appointments already scheduled     Oct 30, 2018  1:30 PM CDT   (Arrive by 1:15 PM)   [facility] Routine Visit with  EEG TECH 2   EEG [facility] OUTPATIENT ([facility])    [address]  [address] [facility] [address] [phone]              Who to contact     Please call your clinic at [phone] to:    Ask questions about your health    Make or cancel appointments    Discuss your medicines    Learn about your test results    Speak to your doctor            Additional Information About Your Visit        MyChart Information     [employer] is an electronic gateway that provides easy, online access to your medical records. With [employer], you can request a clinic appointment, read your test results, renew a prescription or communicate with your care team.     To sign up for [email or web address] visit the website at [email or web address]   You will be asked to enter the access code listed below, as well as some personal information. Please follow the directions to create your username and password.     Your access code is: [email or web address]  Expires: 1/3/2019  4:50 PM     Your access code will  in 90 days. If you need help or a new code, please contact your [facility] or call [phone] for assistance.        Care EveryWhere ID     This is your Care EveryWhere ID. This could be used by other organizations to access your [place] medical records  [id number]    "     Your Vitals Were     Pulse Temperature Respirations Height Pulse Oximetry Breastfeeding?    105 98.7  F (37.1  C) (Oral) 15 1.549 m (5' 1\") 99% No    BMI (Body Mass Index)                   22.07 kg/m2            Blood Pressure from Last 3 Encounters:   No data found for BP    Weight from Last 3 Encounters:   No data found for Wt              Today, you had the following     No orders found for display         Today's Medication Changes          These changes are accurate as of 10/18/18 11:59 PM.  If you have any questions, ask your nurse or doctor.               Start taking these medicines.        Dose/Directions    SUMAtriptan 5 MG/ACT nasal spray   Commonly known as:  IMITREX   Used for:  Migraine with aura and without status migrainosus, not intractable   Started by:  Ken Durbin MD        1 spray in each nostril at onset of headaches, may repeat in 1 hour   Quantity:  30 each   Refills:  11            Where to get your medicines      These medications were sent to [facility]/pharmacy #[id number] - [place], [place] - [address]  [address] [address]     Phone:  [phone]     SUMAtriptan 5 MG/ACT nasal spray                Primary Care Provider Office Phone # Fax #    Tameka Narayan [phone]       [facility] [address] [address]        Equal Access to Services     AUREA HENAO AH: Hadii aad ku hadasho Soomaali, waaxda luqadaha, qaybta kaalmada adeegyada, waxay idiin hayaan johana briggs . So [facility] [phone].    ATENCIÓN: Si habla español, tiene a dockery disposición servicios gratuitos de asistencia lingüística. Llame al [phone].    We comply with applicable federal civil rights laws and [place] laws. We do not discriminate on the basis of race, color, national origin, age, disability, sex, sexual orientation, or gender identity.            Thank you!     Thank you for choosing [facility] NEUROLOGY  " for your care. Our goal is always to provide you with excellent care. Hearing back from our patients is one way we can continue to improve our services. Please take a few minutes to complete the written survey that you may receive in the mail after your visit with us. Thank you!             Your Updated Medication List - Protect others around you: Learn how to safely use, store and throw away your medicines at www.disposemymeds.org.          This list is accurate as of 10/18/18 11:59 PM.  Always use your most recent med list.                   Brand Name Dispense Instructions for use Diagnosis    ADDERALL XR PO      Take 20 mg by mouth daily        EFFEXOR PO      Take 150 mg by mouth daily        IMITREX PO      Take 25 mg by mouth as needed for migraine        norethindrone-ethinyl estradiol 1-20 MG-MCG per tablet    JUNEL FE 1/20     Take 1 tablet by mouth daily        SUMAtriptan 5 MG/ACT nasal spray    IMITREX    30 each    1 spray in each nostril at onset of headaches, may repeat in 1 hour    Migraine with aura and without status migrainosus, not intractable       vitamin D 62120 UNIT capsule    ERGOCALCIFEROL     every 7 days

## 2018-10-18 NOTE — PROGRESS NOTES
Answers for HPI/ROS submitted by the patient on 10/18/2018   General Symptoms: Yes  Skin Symptoms: No  HENT Symptoms: No  EYE SYMPTOMS: Yes  HEART SYMPTOMS: Yes  LUNG SYMPTOMS: No  INTESTINAL SYMPTOMS: No  URINARY SYMPTOMS: No  GYNECOLOGIC SYMPTOMS: Yes  BREAST SYMPTOMS: No  SKELETAL SYMPTOMS: No  BLOOD SYMPTOMS: No  NERVOUS SYSTEM SYMPTOMS: Yes  MENTAL HEALTH SYMPTOMS: No  PEDS Symptoms: No  Fever: No  Loss of appetite: Yes  Weight loss: Yes  Weight gain: No  Fatigue: Yes  Night sweats: Yes  Chills: Yes  Increased stress: Yes  Excessive hunger: No  Excessive thirst: Yes  Feeling hot or cold when others believe the temperature is normal: Yes  Loss of height: No  Post-operative complications: No  Surgical site pain: No  Hallucinations: No  Change in or Loss of Energy: Yes  Hyperactivity: No  Confusion: Yes  Eye pain: No  Vision loss: Yes  Dry eyes: No  Watery eyes: No  Eye bulging: No  Double vision: Yes  Flashing of lights: Yes  Spots: Yes  Floaters: Yes  Redness: No  Crossed eyes: No  Tunnel Vision: No  Yellowing of eyes: No  Eye irritation: No  Chest pain or pressure: No  Fast or irregular heartbeat: Yes  Pain in legs with walking: No  Trouble breathing while lying down: No  Fingers or toes appear blue: No  High blood pressure: No  Low blood pressure: No  Fainting: No  Murmurs: No  Pacemaker: No  Varicose veins: No  Edema or swelling: No  Wake up at night with shortness of breath: No  Light-headedness: No  Exercise intolerance: No  Trouble with coordination: Yes  Dizziness or trouble with balance: Yes  Fainting or black-out spells: No  Memory loss: Yes  Headache: Yes  Seizures: No  Speech problems: Yes  Tingling: No  Tremor: No  Weakness: Yes  Difficulty walking: No  Paralysis: No  Numbness: No  Bleeding or spotting between periods: Yes  Heavy or painful periods: No  Irregular periods: Yes  Vaginal discharge: No  Hot flashes: No  Vaginal dryness: No  Genital ulcers: No  Reduced libido: No  Painful intercourse:  No  Difficulty with sexual arousal: No  Post-menopausal bleeding: No

## 2018-10-18 NOTE — NURSING NOTE
Chief Complaint   Patient presents with     Consult     [facility] NEW VISUAL DISTURBANCE      Depression Response    Patient completed the PHQ-9 assessment for depression and scored >9? Yes  Question 9 on the PHQ-9 was positive for suicidality? No  Is the patient already receiving treatment for depression? Yes  Patient would like to speak with behavioral health team ([facility] only)? No    I personally notified the following: visit provider    Behavioral Health/Social Work Contact Information     [facility]  Song Morgan MA, LMFT  Lead Behavioral Health Clinician  Phone: [phone]  [facility] Pager: [phone]    Non-[facility]  [facility] On-Call   Pager: [phone]       hSar Cohn, EMT

## 2018-10-19 ASSESSMENT — PATIENT HEALTH QUESTIONNAIRE - PHQ9: SUM OF ALL RESPONSES TO PHQ QUESTIONS 1-9: 13

## 2018-10-19 NOTE — PROGRESS NOTES
Service Date: 10/18/2018      Tameka Narayan MD   [facility] [address] [address]      RE: Natalie Kelly   MRN: [id number]   : 2000      Dear Dr. Narayan:      Ms. Natalie Kelly is a 17-year-old who is a student of [employer] here at [employer] and aiming to study dyslexia.  She had an episode where she was seen in the emergency room on 10/05, where she had this sensation throughout the day of confusion, grogginess and feeling off, and this started as difficulty with hand and eye coordination, reaching for objects, freezing of the hands when writing, fatigue and increased sleep and some night sweats with some headaches eventually developing and color disturbance, depth and focus instability and brightness.  She came to the emergency room where she was evaluated with these episodes and had a normal exam including visual acuity, O2 sats, blood pressure.  Her neurological exam and physical exam were both normal.  Lab studies were pretty normal and she had an MRI of the brain which has been reviewed by the undersigned.  It is completely normal as well and was with and without contrast.  Pregnancy test is negative and the urine test was likewise negative.  This was her second episode, according to the ER note and status-wise, she was referred for Neurology.  She is visiting today accompanied by her mother, who is a very good historian as well as the patient.      She has had no further problems since that time.  She says she did not drink  that day and did not take any drugs or anything.  She is on Lexapro and Effexor but her dose has been stable, so has her birth control pills been stable.  She has felt fine since and came in to be checked.  She does have a history of headaches which have been occurring 4-5 times a month with some visual auras associated with it, and they started when she was 5 years old.  The mother said she has migraine.  They favor that they relate  to the weather and pressure changes.  She has been on sumatriptan on low dose of 25 mg that she takes once and does not repeat it.  She is also on Effexor 150 per day, birth control pill once per day, which she was on after the headache onset and she is also on amphetamine-dextroamphetamine, Adderall, for ADHD which the mother said runs in the family.      PAST MEDICAL HISTORY:  Essentially otherwise unremarkable.  There is some history of depression, anxiety and uncomplicated asthma.      FAMILY HISTORY:  Shows there is significant migraine in the mother and an uncle.      REVIEW OF SYSTEMS:  Does not show anything else of note except for excessive thirst, feeling hot or cold, but the day of this episode, she was not feeling particularly off to suspect a serotonin syndrome.      PHYSICAL EXAMINATION:  Today, she is very pleasant.  Blood pressure 118/86, pulse 105, O2 sats are normal.   MENTAL STATUS:  Exam is excellent.  She is a very bright young woman.  Cranial nerves show some beautiful eye grounds with very well seen optic disks.  She has normal extraocular movements.   NECK:  Supple.   REFLEXES:  Normoactive, symmetrical.  ___ left-handed, but gravitated to right-handed or ambidexterity.   EXTREMITIES:  Gait and station are unremarkable.  Sensory and motor exams are normal.      In summary, in this episode she had peculiar feelings.  I am not sure what it represented.  She has had other episodes where her arms just stop working briefly, but these are associated sometimes with headaches but it may switch sides.  There is no family history of hemiplegic migraine.  She has used sumatriptan, but on a low dose, and will try her on the nasal spray, 1 in each nostril at onset and may repeat in 1 hour.  She has had an MRI, which was reviewed and is completely normal.  The episodes of peculiar so will obtain an EEG to rule out a seizure.  We will see her back after this study is completed.  Thank you for referring to  Neurology.      Sincerely,      MD JOHANNY Hogue MD             D: 10/18/2018   T: 10/19/2018   MT: al      Name:     SALONI HERMOSILLO   MRN:      [id number]        Account:      [id number]   :      2000           Service Date: 10/18/2018      Document: [id number]

## 2018-10-30 ENCOUNTER — ALLIED HEALTH/NURSE VISIT (OUTPATIENT)
Dept: NEUROLOGY | Facility: CLINIC | Age: 18
End: 2018-10-30
Payer: COMMERCIAL

## 2018-10-30 DIAGNOSIS — G43.109 MIGRAINE WITH AURA AND WITHOUT STATUS MIGRAINOSUS, NOT INTRACTABLE: Primary | ICD-10-CM

## 2018-10-30 NOTE — MR AVS SNAPSHOT
After Visit Summary   10/30/2018    Natalie Kelly    MRN: [id number]           Patient Information     Date Of Birth          2000        Visit Information        Provider Department      10/30/2018 1:30 PM  EEG TECH 2 EEG [facility] OUTPATIENT        Today's Diagnoses     Migraine with aura and without status migrainosus, not intractable    -  1       Follow-ups after your visit        Who to contact     Please call your clinic at [phone] to:    Ask questions about your health    Make or cancel appointments    Discuss your medicines    Learn about your test results    Speak to your doctor            Additional Information About Your Visit        MyChart Information     [facility] gives you secure access to your electronic health record. If you see a primary care provider, you can also send messages to your care team and make appointments. If you have questions, please call your primary care clinic.  If you do not have a primary care provider, please call [phone] and they will assist you.      [facility] is an electronic gateway that provides easy, online access to your medical records. With [facility], you can request a clinic appointment, read your test results, renew a prescription or communicate with your care team.     To access your existing account, please contact your [facility] or call [phone] for assistance.        Care EveryWhere ID     This is your Care EveryWhere ID. This could be used by other organizations to access your [place] medical records  [id number]         Blood Pressure from Last 3 Encounters:   No data found for BP    Weight from Last 3 Encounters:   No data found for Wt              Today, you had the following     No orders found for display       Primary Care Provider Office Phone # Fax #    Tameka Narayan [phone]       [facility] [address] [address]         Equal Access to Services     [facility] HospitalJOSELINE : Hadii aad ku hadcharitychava Merigio, wayinada luqlaryha, qatorita melodienguyễnnora pizano. So [facility] [phone].    ATENCIÓN: Si habla español, tiene a dockery disposición servicios gratuitos de asistencia lingüística. Llame al [phone].    We comply with applicable federal civil rights laws and [place] laws. We do not discriminate on the basis of race, color, national origin, age, disability, sex, sexual orientation, or gender identity.            Thank you!     Thank you for choosing [facility] OUTPATIENT  for your care. Our goal is always to provide you with excellent care. Hearing back from our patients is one way we can continue to improve our services. Please take a few minutes to complete the written survey that you may receive in the mail after your visit with us. Thank you!             Your Updated Medication List - Protect others around you: Learn how to safely use, store and throw away your medicines at www.disposemymeds.org.          This list is accurate as of 10/30/18 11:59 PM.  Always use your most recent med list.                   Brand Name Dispense Instructions for use Diagnosis    ADDERALL XR PO      Take 20 mg by mouth daily        EFFEXOR PO      Take 150 mg by mouth daily        IMITREX PO      Take 25 mg by mouth as needed for migraine        norethindrone-ethinyl estradiol 1-20 MG-MCG per tablet    JUNEL FE 1/20     Take 1 tablet by mouth daily        SUMAtriptan 5 MG/ACT nasal spray    IMITREX    30 each    1 spray in each nostril at onset of headaches, may repeat in 1 hour    Migraine with aura and without status migrainosus, not intractable       vitamin D2 45696 UNIT capsule    ERGOCALCIFEROL     every 7 days

## 2018-11-01 NOTE — PROCEDURES
Procedure Date: 10/30/2018      EEG #:        DATE OF STUDY:  10/30/2018      This is an outpatient no-video EEG on a 17-year-old woman born 2000.        TECHNICAL SUMMARY: This   EEG monitoring procedure was performed with 23 scalp electrodes in 10-20 electrode system placements, and additional scalp, precordial and other surface electrodes used for electrical referencing and artifact detection.         DURATION OF RECORDIN minutes       Background activity during the resting and waking state consisted of very well-regulated alpha activity with eyes closed, which suppressed normally with eye opening.  Background activity was symmetrical, and no focal slowing was noted.      Photic stimulation was done from 2-25 Hz, and no changes were noted.  Hyperventilation was well performed for 5 minutes, and no significant changes were noted.        The patient did not attain natural sleep during this short recording.        INTERICTAL ACTIVITY:  None.      ICTAL ACTIVITY:  None.      IMPRESSION:  Normal EEG.        EEG was done as the patient had a history of disorientation, dizziness and lightheadedness that persisted for a considerable length of time, and a question of possible seizure activity had been raised.         DINA BETANCUR MD             D: 10/31/2018   T: 10/31/2018   MT: rene      Name:     SALONI HERMOSILLO   MRN:      [id number]        Account:        [id number]   :      2000           Procedure Date: 10/30/2018      Document: [id number]

## 2018-11-15 ENCOUNTER — MEDICAL CORRESPONDENCE (OUTPATIENT)
Dept: HEALTH INFORMATION MANAGEMENT | Facility: CLINIC | Age: 18
End: 2018-11-15

## 2018-11-30 ENCOUNTER — OFFICE VISIT (OUTPATIENT)
Dept: NEUROLOGY | Facility: CLINIC | Age: 18
End: 2018-11-30
Payer: COMMERCIAL

## 2018-11-30 VITALS
SYSTOLIC BLOOD PRESSURE: 121 MMHG | HEART RATE: 92 BPM | BODY MASS INDEX: 21.9 KG/M2 | WEIGHT: 116 LBS | HEIGHT: 61 IN | DIASTOLIC BLOOD PRESSURE: 79 MMHG | OXYGEN SATURATION: 100 %

## 2018-11-30 DIAGNOSIS — R41.0 CONFUSION: ICD-10-CM

## 2018-11-30 DIAGNOSIS — R41.0 CONFUSION: Primary | ICD-10-CM

## 2018-11-30 LAB
CRP SERPL-MCNC: <2.9 MG/L (ref 0–8)
ERYTHROCYTE [SEDIMENTATION RATE] IN BLOOD BY WESTERGREN METHOD: 6 MM/H (ref 0–20)

## 2018-11-30 ASSESSMENT — PAIN SCALES - GENERAL: PAINLEVEL: MILD PAIN (2)

## 2018-11-30 NOTE — LETTER
2018       RE: Natalie Kelly  [address]  [facility] [place] [address]     Dear Colleague,    Thank you for referring your patient, Natalie Kelly, to the [facility] NEUROLOGY at [facility]. Please see a copy of my visit note below.    Service Date: 2018      Tameka Narayan MD   [facility] [address] [address]      RE: Natalie Kelly   MRN: [id number]   : 2000      Dear Dr. Narayan:      Once again we saw Natalie with her mother.      The workup in Neurology has been for this a number of different manifestations which are not stable or not suggest any particular etiology.  She still has them and these are episodes of confusion and not feeling right and the mother watching her pupils as they fluctuate a lot.  We have done EEGs and MRI, these are being completely normal.  The mother too had some sort of unknown disorder and they feel she has some autoimmune disease.  She has been worked up at [place] without satisfactory diagnosis.  She shows me a picture of a face and she has some episodic flushing.      She says there is a lot of immune disease in the family.  Natalie has multiple symptoms that are unexplained.  She has had a normal neurological exam in the past.        PHYSICAL EXAMINATION:   In the examination today:   VITAL SIGNS:   Blood pressure 121/79.  Pulse 92.  Weight 116 pounds.     NEUROLOGIC:   She shows no change from before.      In summary, a number of unexplained symptoms.  Family history of autoimmune disease.  We will check a few of these.  She has had studies in the past by her primary and this have shown normal results.  We will do a number of laboratory studies for immune disease and see her for followup.  Reassurance given.      Sincerely,       Ken Durbin MD

## 2018-11-30 NOTE — MR AVS SNAPSHOT
"              After Visit Summary   11/30/2018    Natalie Kelly    MRN: [id number]           Patient Information     Date Of Birth          2000        Visit Information        Provider Department      11/30/2018 3:00 PM Ken Durbin MD [facility] Neurology        Today's Diagnoses     Confusion    -  1       Follow-ups after your visit        Follow-up notes from your care team     Return in about 3 months (around 2/28/2019).      Who to contact     Please call your clinic at [phone] to:    Ask questions about your health    Make or cancel appointments    Discuss your medicines    Learn about your test results    Speak to your doctor            Additional Information About Your Visit        [employer] Information     [email or web address] gives you secure access to your electronic health record. If you see a primary care provider, you can also send messages to your care team and make appointments. If you have questions, please call your primary care clinic.  If you do not have a primary care provider, please call [phone] and they will assist you.      [email or web address] is an electronic gateway that provides easy, online access to your medical records. With [email or web address], you can request a clinic appointment, read your test results, renew a prescription or communicate with your care team.     To access your existing account, please contact your [facility] or call [phone] for assistance.        Care EveryWhere ID     This is your Care EveryWhere ID. This could be used by other organizations to access your [place] medical records  [id number]        Your Vitals Were     Pulse Height Pulse Oximetry BMI (Body Mass Index)          92 1.549 m (5' 1\") 100% 21.92 kg/m2         Blood Pressure from Last 3 Encounters:   11/30/18 121/79   10/18/18 118/86   10/05/18 138/76    Weight from Last 3 Encounters:   11/30/18 52.6 kg (116 lb) (26 %)*   10/18/18 53 kg (116 lb 12.8 oz) (28 %)*   10/05/18 54.8 kg " (120 lb 14.4 oz) (36 %)*     * Growth percentiles are based on [facility] 2-20 Years data.               Primary Care Provider Office Phone # Fax #    Tameka Narayan [phone]       [facility] [address] [address]        Equal Access to Services     MERISSA HENAO : Hadii aad ku hadasho Soomaali, waaxda luqadaha, qaybta kaalmada adeegyada, waxay idiin hayaan adeeg kharash la'aan ah. So [facility] [phone].    ATENCIÓN: Si habla español, tiene a dockery disposición servicios gratuitos de asistencia lingüística. Llame al [phone].    We comply with applicable federal civil rights laws and [place] laws. We do not discriminate on the basis of race, color, national origin, age, disability, sex, sexual orientation, or gender identity.            Thank you!     Thank you for choosing [facility] NEUROLOGY  for your care. Our goal is always to provide you with excellent care. Hearing back from our patients is one way we can continue to improve our services. Please take a few minutes to complete the written survey that you may receive in the mail after your visit with us. Thank you!             Your Updated Medication List - Protect others around you: Learn how to safely use, store and throw away your medicines at www.disposemymeds.org.          This list is accurate as of 11/30/18 11:59 PM.  Always use your most recent med list.                   Brand Name Dispense Instructions for use Diagnosis    ADDERALL XR PO      Take 20 mg by mouth daily        EFFEXOR PO      Take 150 mg by mouth daily        IMITREX PO      Take 25 mg by mouth as needed for migraine        norethindrone-ethinyl estradiol 1-20 MG-MCG tablet    JUNEL FE 1/20     Take 1 tablet by mouth daily        SUMAtriptan 5 MG/ACT nasal spray    IMITREX    30 each    1 spray in each nostril at onset of headaches, may repeat in 1 hour    Migraine with aura and without status migrainosus, not intractable        vitamin D2 24049 units (1250 mcg) capsule    ERGOCALCIFEROL     every 7 days

## 2018-11-30 NOTE — PROGRESS NOTES
Service Date: 2018      Tameka Narayan MD   [facility] [place] [address]      RE: Saloni Hermosillo   MRN: [id number]   : 2000      Dear Dr. Narayan:      Once again we saw Saloni with her mother.      The workup in Neurology has been for this a number of different manifestations which are not suggestive of  any particular etiology.  She still has them and these are episodes of confusion and not feeling right and the mother watching her pupils as she reports they fluctuate a lot.    We have done EEGs and MRI, these are being completely normal.    The mother reports she has some sort of unknown immune disorder and she feel she has some autoimmune disease.  She has been worked up at [facility] without satisfactory diagnosis.  She shows me a picture of a face and she has some episodic flushing not diagnosed. She is concerned about Saloni having somethng like her undiagnosed..      She says there is a lot of immune disease in the family.  Saloni has multiple symptoms that are unexplained.  She has had a normal neurological exam in the past.        PHYSICAL EXAMINATION:   In the examination today:   VITAL SIGNS:   Blood pressure 121/79.  Pulse 92.  Weight 116 pounds.     In summary, a number of unexplained symptoms.  Family history of autoimmune disease.  We will check a few of these.  She has had studies in the past by her primary and this have shown normal results.  We will do a number of laboratory studies for immune disease and see her for followup.  Reassurance given.      Sincerely,       MD JOHANNY Hogue MD             D: 2018   T: 2018   MT: AMRIK      Name:     SALONI HERMOSILLO   MRN:      [id number]        Account:      [id number]   :      2000           Service Date: 2018      Document: [id number]

## 2018-11-30 NOTE — NURSING NOTE
"Chief Complaint   Patient presents with     RECHECK     FOLLOW UP VISUAL DISTURBANCES     WAS NOT HAPPY WITH LAST APPOINTMENT, FELT SHE WASN'T LISTENED TO. STILL HAVING NERVE PAIN IN LEFT ARM. STILL HAVING  VISUAL PROBLEMS, MOM WITNESSED THAT HER PUPILS \"BLEW\" AND THIS LASTED FOR 10 MINUTES BEFORE RETURNING TO NORMAL. PATIENT STATED THAT YOU KEPT TELLING HER SHE SMOKED AND WAS TAKING LEXAPRO AND SHE DOES NEITHER  Keyanna Luna MA    "

## 2018-12-03 LAB
ANA SER QL IF: NEGATIVE
B BURGDOR IGG+IGM SER QL: 0.05 (ref 0–0.89)
COLLECT DURATION TIME SPEC: NORMAL H
COPRO1/CREAT UR-SRTO: 1 UMOL/MOL CRT (ref 0–6)
COPRO3/CREAT UR-SRTO: 3 UMOL/MOL CRT (ref 0–14)
CREAT 24H UR-MCNC: 282 MG/DL
CREAT 24H UR-MRATE: NORMAL MG/D (ref 700–1600)
HEPTACARBOXYLATE/CREAT UR-SRTO: 0 UMOL/MOL CRT (ref 0–2)
PORPHYRIN FRACT 24H UR-IMP: NEGATIVE
SPECIMEN VOL ?TM UR: NORMAL ML
TTG IGA SER-ACNC: <1 U/ML
TTG IGG SER-ACNC: <1 U/ML
UROPOR/CREAT UR-SRTO: 0 UMOL/MOL CRT (ref 0–4)

## 2019-02-08 ENCOUNTER — RECORDS - HEALTHEAST (OUTPATIENT)
Dept: ADMINISTRATIVE | Facility: OTHER | Age: 19
End: 2019-02-08

## 2019-02-11 ENCOUNTER — RECORDS - HEALTHEAST (OUTPATIENT)
Dept: ADMINISTRATIVE | Facility: OTHER | Age: 19
End: 2019-02-11

## 2019-02-11 ENCOUNTER — AMBULATORY - HEALTHEAST (OUTPATIENT)
Dept: CARDIOLOGY | Facility: CLINIC | Age: 19
End: 2019-02-11

## 2019-02-13 ENCOUNTER — OFFICE VISIT - HEALTHEAST (OUTPATIENT)
Dept: CARDIOLOGY | Facility: CLINIC | Age: 19
End: 2019-02-13

## 2019-02-13 DIAGNOSIS — J45.21 MILD INTERMITTENT ASTHMA WITH EXACERBATION: ICD-10-CM

## 2019-02-13 DIAGNOSIS — R00.2 PALPITATIONS: ICD-10-CM

## 2019-02-13 LAB
ATRIAL RATE - MUSE: 60 BPM
DIASTOLIC BLOOD PRESSURE - MUSE: NORMAL MMHG
INTERPRETATION ECG - MUSE: NORMAL
P AXIS - MUSE: 48 DEGREES
PR INTERVAL - MUSE: 132 MS
QRS DURATION - MUSE: 80 MS
QT - MUSE: 416 MS
QTC - MUSE: 416 MS
R AXIS - MUSE: 79 DEGREES
SYSTOLIC BLOOD PRESSURE - MUSE: NORMAL MMHG
T AXIS - MUSE: 22 DEGREES
VENTRICULAR RATE- MUSE: 60 BPM

## 2019-02-13 ASSESSMENT — MIFFLIN-ST. JEOR: SCORE: 1222.19

## 2019-02-21 ENCOUNTER — HOSPITAL ENCOUNTER (OUTPATIENT)
Dept: CARDIOLOGY | Facility: CLINIC | Age: 19
Discharge: HOME | End: 2019-02-21
Attending: INTERNAL MEDICINE

## 2019-02-21 DIAGNOSIS — R00.2 PALPITATIONS: ICD-10-CM

## 2019-02-21 DIAGNOSIS — J45.21 MILD INTERMITTENT ASTHMA WITH EXACERBATION: ICD-10-CM

## 2019-02-21 LAB
AORTIC ROOT: 2.9 CM
BSA FOR ECHO PROCEDURE: 1.5 M2
CV BLOOD PRESSURE: ABNORMAL MMHG
CV ECHO HEIGHT: 61 IN
CV ECHO WEIGHT: 116 LBS
DOP CALC LVOT AREA: 3.14 CM2
DOP CALC LVOT DIAMETER: 2 CM
DOP CALC LVOT PEAK VEL: 77.3 CM/S
DOP CALC LVOT STROKE VOLUME: 46.5 CM3
DOP CALCLVOT PEAK VEL VTI: 14.8 CM
EJECTION FRACTION: 57 % (ref 55–75)
FRACTIONAL SHORTENING: 33.3 % (ref 28–44)
INTERVENTRICULAR SEPTUM IN END DIASTOLE: 0.8 CM (ref 0.6–0.9)
IVS/PW RATIO: 1
LA AREA 1: 11.6 CM2
LA AREA 2: 11.9 CM2
LEFT ATRIUM LENGTH: 4.52 CM
LEFT ATRIUM SIZE: 2.3 CM
LEFT ATRIUM TO AORTIC ROOT RATIO: 0.79 NO UNITS
LEFT ATRIUM VOLUME INDEX: 17.3 ML/M2
LEFT ATRIUM VOLUME: 26 ML
LEFT VENTRICLE CARDIAC INDEX: 1.9 L/MIN/M2
LEFT VENTRICLE CARDIAC OUTPUT: 2.8 L/MIN
LEFT VENTRICLE DIASTOLIC VOLUME INDEX: 73.3 CM3/M2 (ref 29–61)
LEFT VENTRICLE DIASTOLIC VOLUME: 110 CM3 (ref 46–106)
LEFT VENTRICLE HEART RATE: 61 BPM
LEFT VENTRICLE MASS INDEX: 67.5 G/M2
LEFT VENTRICLE SYSTOLIC VOLUME INDEX: 31.3 CM3/M2 (ref 8–24)
LEFT VENTRICLE SYSTOLIC VOLUME: 47 CM3 (ref 14–42)
LEFT VENTRICULAR INTERNAL DIMENSION IN DIASTOLE: 4.2 CM (ref 3.8–5.2)
LEFT VENTRICULAR INTERNAL DIMENSION IN SYSTOLE: 2.8 CM (ref 2.2–3.5)
LEFT VENTRICULAR MASS: 101.3 G
LEFT VENTRICULAR OUTFLOW TRACT MEAN GRADIENT: 1 MMHG
LEFT VENTRICULAR OUTFLOW TRACT MEAN VELOCITY: 52.2 CM/S
LEFT VENTRICULAR OUTFLOW TRACT PEAK GRADIENT: 2 MMHG
LEFT VENTRICULAR POSTERIOR WALL IN END DIASTOLE: 0.8 CM (ref 0.6–0.9)
LV STROKE VOLUME INDEX: 31 ML/M2
MITRAL VALVE E/A RATIO: 3
MV AVERAGE E/E' RATIO: 7.1 CM/S
MV DECELERATION TIME: 194 MS
MV E'TISSUE VEL-LAT: 17 CM/S
MV E'TISSUE VEL-MED: 11.9 CM/S
MV LATERAL E/E' RATIO: 6
MV MEDIAL E/E' RATIO: 8.6
MV PEAK A VELOCITY: 34.3 CM/S
MV PEAK E VELOCITY: 102 CM/S
NUC REST DIASTOLIC VOLUME INDEX: 1851.2 LBS
NUC REST SYSTOLIC VOLUME INDEX: 61 IN
TRICUSPID REGURGITATION PEAK PRESSURE GRADIENT: 14.4 MMHG
TRICUSPID VALVE ANULAR PLANE SYSTOLIC EXCURSION: 1.9 CM
TRICUSPID VALVE PEAK REGURGITANT VELOCITY: 190 CM/S

## 2019-02-21 ASSESSMENT — MIFFLIN-ST. JEOR: SCORE: 1222.19

## 2019-09-12 ENCOUNTER — RECORDS - HEALTHEAST (OUTPATIENT)
Dept: LAB | Facility: CLINIC | Age: 19
End: 2019-09-12

## 2019-09-13 LAB
C TRACH DNA SPEC QL PROBE+SIG AMP: NEGATIVE
N GONORRHOEA DNA SPEC QL NAA+PROBE: NEGATIVE

## 2019-11-03 ENCOUNTER — HEALTH MAINTENANCE LETTER (OUTPATIENT)
Age: 19
End: 2019-11-03

## 2020-01-16 ENCOUNTER — RECORDS - HEALTHEAST (OUTPATIENT)
Dept: LAB | Facility: CLINIC | Age: 20
End: 2020-01-16

## 2020-01-17 LAB — 25(OH)D3 SERPL-MCNC: 96.1 NG/ML (ref 30–80)

## 2020-11-03 ENCOUNTER — RECORDS - HEALTHEAST (OUTPATIENT)
Dept: LAB | Facility: CLINIC | Age: 20
End: 2020-11-03

## 2020-11-05 LAB — 25(OH)D3 SERPL-MCNC: 119 NG/ML (ref 30–80)

## 2020-11-16 ENCOUNTER — HEALTH MAINTENANCE LETTER (OUTPATIENT)
Age: 20
End: 2020-11-16

## 2021-06-02 VITALS — WEIGHT: 115.7 LBS | HEIGHT: 61 IN | BODY MASS INDEX: 21.84 KG/M2

## 2021-06-02 VITALS — BODY MASS INDEX: 21.84 KG/M2 | WEIGHT: 115.7 LBS | HEIGHT: 61 IN

## 2021-06-16 PROBLEM — R00.2 PALPITATIONS: Status: ACTIVE | Noted: 2019-02-13

## 2021-06-16 PROBLEM — J45.21 MILD INTERMITTENT ASTHMA WITH EXACERBATION: Status: ACTIVE | Noted: 2019-02-13

## 2021-06-24 NOTE — PATIENT INSTRUCTIONS - HE
Natalie,  It certainly was nice to meet you today.  Per our conversation you're  having some skipped beats in the chest.  This could represen an abnormal heartbeat and the reason I am checking the ultrasound of the heart and the heart monitor.  We will call you the results of these tests.    Branden Interiano

## 2021-06-24 NOTE — PROGRESS NOTES
[facility] Office Consult     Assessment:     1. Palpitations -sounds like an exaggerated sinus arrhythmia.  She is on beta-blockers as well as Adderall as well as hormones.  I think is all needs to be sorted out but will obtain echocardiogram looking for structural heart disease as well as 21-day ACT monitor.  I told her we would let her know results and if unremarkable then I would not be concerned about structural heart disease or major arrhythmias.  I did instruct her to try to decrease caffeine intake, and if possible to back off in each of her medications for 2-week holiday, specifically Adderall, hormones, beta-blockers etc.  We will contact her with results.   2. Mild intermittent asthma with exacerbation -stable at this point time with as needed inhalers.   3.  History of migraine headaches-on beta-blocker as prevention.     Plan:   1.  Echocardiogram.  2.  21-day ACT monitor.  3.  Follow-up on an as-needed basis.    History of Present Illness:   Thank you for asking the [facility] team to see Natalie Kelly a 18 y.o.  female  in consultation  to evaluate palpitations.   Patient states over the last 6 months she has had an exaggerated heartbeat sensation.  She feels like her heart wants to slow down and stop.  She might get lightheaded with this.  At times her mother has witnessed her hitting her chest to restart her heart.  She cannot ascribe this to changes in Adderall dosage, her birth control pill dosage, changing from propranolol to metoprolol for migraine prophylaxis, or other inciting issues.  There is no sustained chest discomfort, syncope, dizziness, significant shortness of breath, PND or orthopnea.    Past Medical History:   Mild intermittent asthma  Migraine headaches  ADHD  PTSD    Past history is negative for cancer, tuberculosis, diabetes mellitus, myocardial infarction,  rheumatic fever, hypertension, cerebrovascular accident, chronic kidney disease, peptic ulcer  "disease, chronic obstructive pulmonary disease, or thyroid disorder  and no lipid disorder.    Past Surgical History:   History reviewed. No pertinent surgical history.    Family History:   Family history positive hypertension with an MI in her mother's father in his 80s.  Mother interjects the family has strong family history of autoimmune disorders.    Social History:   She lives independently, she is attending [employer] studying Chinese and behavioral sciences, reports that  has never smoked. she has never used smokeless tobacco. She reports that she does not use drugs.  The primary care physician is Tameka Narayan MD    Meds:   Scheduled Meds:  Current Outpatient Medications   Medication Sig Dispense Refill     ascorbic acid, vitamin C, (ASCORBIC ACID WITH RUBY HIPS) 500 MG tablet Take 500 mg by mouth daily.       cyanocobalamin (VITAMIN B-12) 500 MCG tablet Take 500 mcg by mouth daily.       dextroamphetamine-amphetamine (ADDERALL XR) 20 MG 24 hr capsule Take 20 mg by mouth daily.       ergocalciferol (ERGOCALCIFEROL) 50,000 unit capsule Take 1 capsule by mouth once a week.  0     metoprolol tartrate (LOPRESSOR) 25 MG tablet Take 25 mg by mouth 2 (two) times a day.  1     multivitamin therapeutic tablet Take 1 tablet by mouth daily.       norethindrone-e.estradiol-iron (LOESTRIN 24 FE) 1 mg-20 mcg (24)/75 mg (4) Tab per tablet Take 1 tablet by mouth daily.       ondansetron (ZOFRAN) 8 MG tablet Take 8 mg by mouth as needed.  2     SUMAtriptan (IMITREX) 50 MG tablet Take 50 mg by mouth as needed.  2     venlafaxine (EFFEXOR-XR) 150 MG 24 hr capsule Take 150 mg by mouth daily.  1     VENTOLIN HFA 90 mcg/actuation inhaler Inhale 2 puffs as needed.  4     No current facility-administered medications for this visit.        PRN Meds:.    Allergies:   Garlic    Objective:      Physical Exam  115 lb 11.2 oz (52.5 kg)  5' 1\" (1.549 m)  Body mass index is 21.86 kg/m .  BP 98/60 (Patient Site: Left Arm, Patient " "Position: Sitting, Cuff Size: Adult Regular)   Pulse 80   Resp 16   Ht 5' 1\" (1.549 m)   Wt 115 lb 11.2 oz (52.5 kg)   BMI 21.86 kg/m      General Appearance:   Alert, cooperative and in no acute distress.   HEENT:  No scleral icterus; the mucous membranes were pink and moist.   Neck: JVP normal. No thyromegaly. No HJR   Chest: The spine was straight. The chest was symmetric.   Lungs:   Respirations unlabored; the lungs are clear to auscultation.   Cardiovascular:   S1 and S2 without murmur, clicks or rubs. Brachial, radial, carotid and posterior tibial pulses are intact and symetrical.  No carotid bruits noted   Abdomen:  No organomegaly, masses, bruits, or tenderness. Bowels sounds are present   Extremities: No cyanosis, clubbing, or edema.   Skin: No xanthelasma.   Neurologic: Mood and affect are appropriate.         Lab Reviewed Personally by myself  Lab Results   Component Value Date     02/23/2018    K 4.1 02/23/2018     02/23/2018    CO2 31 02/23/2018    BUN 9 02/23/2018    CREATININE 0.74 02/23/2018    CALCIUM 9.9 02/23/2018     No results found for: WBC, HGB, HCT, MCV, PLT  No results found for: CHOL, TRIG, HDL, LDLDIRECT  No results found for: BNP    ECG personally reviewed by myself shows normal sinus rhythm with sinus arrhythmia with no acute changes.     Review of Systems:     Review of Systems:   General: WNL  Eyes: Visual Distubance  Ears/Nose/Throat: WNL  Lungs: WNL  Heart: Chest Pain, Shortness of Breath with activity, Irregular Heartbeat  Stomach: WNL  Bladder: WNL  Muscle/Joints: WNL  Skin: WNL  Nervous System: Daytime Sleepiness, Dizziness  Mental Health: Confusion, Depression     Blood: WNL  "

## 2021-09-18 ENCOUNTER — HEALTH MAINTENANCE LETTER (OUTPATIENT)
Age: 21
End: 2021-09-18

## 2022-01-08 ENCOUNTER — HEALTH MAINTENANCE LETTER (OUTPATIENT)
Age: 22
End: 2022-01-08

## 2022-11-19 ENCOUNTER — HEALTH MAINTENANCE LETTER (OUTPATIENT)
Age: 22
End: 2022-11-19

## 2023-06-01 ENCOUNTER — HEALTH MAINTENANCE LETTER (OUTPATIENT)
Age: 23
End: 2023-06-01